# Patient Record
Sex: FEMALE | Race: WHITE
[De-identification: names, ages, dates, MRNs, and addresses within clinical notes are randomized per-mention and may not be internally consistent; named-entity substitution may affect disease eponyms.]

---

## 2017-05-18 ENCOUNTER — HOSPITAL ENCOUNTER (OUTPATIENT)
Dept: HOSPITAL 47 - RADMAMWWP | Age: 67
Discharge: HOME | End: 2017-05-18
Attending: FAMILY MEDICINE
Payer: COMMERCIAL

## 2017-05-18 DIAGNOSIS — Z12.31: Primary | ICD-10-CM

## 2017-05-18 PROCEDURE — 77063 BREAST TOMOSYNTHESIS BI: CPT

## 2018-03-04 ENCOUNTER — HOSPITAL ENCOUNTER (EMERGENCY)
Dept: HOSPITAL 47 - EC | Age: 68
Discharge: HOME | End: 2018-03-04
Payer: COMMERCIAL

## 2018-03-04 VITALS
RESPIRATION RATE: 20 BRPM | TEMPERATURE: 98.3 F | SYSTOLIC BLOOD PRESSURE: 136 MMHG | DIASTOLIC BLOOD PRESSURE: 71 MMHG | HEART RATE: 68 BPM

## 2018-03-04 DIAGNOSIS — R00.1: ICD-10-CM

## 2018-03-04 DIAGNOSIS — F32.9: ICD-10-CM

## 2018-03-04 DIAGNOSIS — Z79.899: ICD-10-CM
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
## 2018-03-04 DIAGNOSIS — Z88.2: ICD-10-CM

## 2018-03-04 DIAGNOSIS — S40.862A: Primary | ICD-10-CM

## 2018-03-04 PROCEDURE — 99284 EMERGENCY DEPT VISIT MOD MDM: CPT

## 2018-03-04 PROCEDURE — 99283 EMERGENCY DEPT VISIT LOW MDM: CPT

## 2018-03-04 PROCEDURE — 93005 ELECTROCARDIOGRAM TRACING: CPT

## 2018-03-04 NOTE — ED
General Adult HPI





- General


Chief complaint: Recheck/Abnormal Lab/Rx


Stated complaint: Abdormal Labs/Cardiac


Time Seen by Provider: 03/04/18 13:19


Source: patient, RN notes reviewed


Mode of arrival: wheelchair


Limitations: no limitations





- History of Present Illness


Initial comments: 





67-year-old female presenting for evaluation of left arm bug bite.  Patient was 

seen at [facility], for some unknown reason patient did have an EKG obtained 

and she was sent for concern of abnormal EKG.  Patient has no history of 

coronary artery disease, no history of hypertension.  She denies any chest pain 

shortness of breath, denies any shoulder pain and jaw pain and neck pain.  

Patient denies nausea vomiting or diarrhea.  Denies fever or chills.  Denies 

any weakness in her left upper extremity.  She was seeking evaluation of itchy 

bug bite to her left upper extremity.  This been present for approximately 3 

weeks, she was recently in [place] and this is where the bug bite occurred.  

She was prescribed Allegra, prednisone, Zantac, and steroid cream prior to 

leaving urgent care.





- Related Data


 Home Medications











 Medication  Instructions  Recorded  Confirmed


 


Citalopram Hydrobromide [CeleXA] 10 mg PO DAILY 08/18/15 08/18/15








 Previous Rx's











 Medication  Instructions  Recorded


 


chlordiazePOXIDE HCl [Librium] 25 mg PO QID #20 capsule 08/18/15











 Allergies











Allergy/AdvReac Type Severity Reaction Status Date / Time


 


Sulfa (Sulfonamide Allergy  Rash/Hives Verified 03/04/18 13:17





Antibiotics)     














Review of Systems


ROS Statement: 


Those systems with pertinent positive or pertinent negative responses have been 

documented in the HPI.





ROS Other: All systems not noted in ROS Statement are negative.





Past Medical History


Additional Past Medical History / Comment(s): alcoholic


History of Any Multi-Drug Resistant Organisms: None Reported


Past Surgical History: Hysterectomy, Orthopedic Surgery


Past Psychological History: Depression


Smoking Status: Never smoker


Past Alcohol Use History: Abuse


Past Drug Use History: None Reported





General Exam


Limitations: no limitations


General appearance: alert, in no apparent distress


Head exam: Present: atraumatic, normocephalic


Eye exam: Present: normal appearance, PERRL, EOMI


ENT exam: Present: normal exam


Neck exam: Present: normal inspection.  Absent: tenderness, meningismus


Respiratory exam: Present: normal lung sounds bilaterally.  Absent: respiratory 

distress


Cardiovascular Exam: Present: regular rate, normal rhythm


GI/Abdominal exam: Present: soft.  Absent: distended, tenderness, guarding


Extremities exam: Present: normal capillary refill, other (Small lesions on the 

anterior surface left upper extremity, no surrounding induration or fluctuance, 

no cellulitis, no signs of deep space infection or ALLERGIC reaction.  Distal 

pulses intact,  strength normal.).  Absent: pedal edema, calf tenderness


Neurological exam: Present: alert, oriented X3, CN II-XII intact.  Absent: 

motor sensory deficit


Psychiatric exam: Present: normal affect, normal mood


Skin exam: Present: warm, dry, intact.  Absent: cyanosis, diaphoretic





Course





 Vital Signs











  03/04/18





  13:15


 


Temperature 98.3 F


 


Pulse Rate 68


 


Respiratory 20





Rate 


 


Blood Pressure 136/71


 


O2 Sat by Pulse 99





Oximetry 














EKG Findings





- EKG Comments:


EKG Findings:: EKG shows sinus bradycardia rate of 59, MA interval 174, QRS 

duration 84, , no ST segment changes, normal T waves, no signs of 

ischemia





Medical Decision Making





- Medical Decision Making





67-year-old female presenting for suspected bug bite.  EKG was obtained and 

medics rest, these EKGs were reviewed, no ST segment changes, no concern for 

ischemia, these were sinus rhythm she is sinus bradycardia with a rate of 59, 

no signs of ischemia.  Patient has no complaints to suggest myocardial 

infarction or ischemia.  Patient will take the medications as prescribed for 

her bug bite, and follow-up with her primary care physician.





Disposition


Clinical Impression: 


 Bug bite





Disposition: HOME SELF-CARE


Condition: Good


Instructions:  Insect Bite or Sting (ED)


Additional Instructions: 


Please take medications prescribed by [facility]


Referrals: 


Ej Lal MD [Primary Care Provider] - 1-2 days


Time of Disposition: 13:48

## 2018-06-04 ENCOUNTER — HOSPITAL ENCOUNTER (OUTPATIENT)
Dept: HOSPITAL 47 - LABWHC1 | Age: 68
Discharge: HOME | End: 2018-06-04
Attending: INTERNAL MEDICINE
Payer: COMMERCIAL

## 2018-06-04 DIAGNOSIS — Z12.31: Primary | ICD-10-CM

## 2018-06-04 PROCEDURE — 77067 SCR MAMMO BI INCL CAD: CPT

## 2018-06-04 PROCEDURE — 77063 BREAST TOMOSYNTHESIS BI: CPT

## 2018-06-05 NOTE — MM
Reason for exam: screening  (asymptomatic).

Last mammogram was performed 1 year and 1 month ago.



History:

Patient is postmenopausal.



Physical Findings:

A clinical breast exam by your physician is recommended on an annual basis and 

results should be correlated with mammographic findings.



MG 3D Screening Mammo W/Cad

Bilateral CC and MLO view(s) were taken.

Prior study comparison: May 18, 2017, bilateral MG 3d screening mammo w/cad.  May 

13, 2016, mammogram, performed at [facility].

There is chronic nodularity in the right breast.  No significant changes when 

compared with prior studies.





ASSESSMENT: Benign, BI-RAD 2



RECOMMENDATION:

Routine screening mammogram of both breasts in 1 year.

## 2020-06-27 ENCOUNTER — HOSPITAL ENCOUNTER (EMERGENCY)
Dept: HOSPITAL 47 - EC | Age: 70
Discharge: HOME | End: 2020-06-27
Payer: COMMERCIAL

## 2020-06-27 VITALS
SYSTOLIC BLOOD PRESSURE: 121 MMHG | TEMPERATURE: 97.7 F | HEART RATE: 68 BPM | RESPIRATION RATE: 18 BRPM | DIASTOLIC BLOOD PRESSURE: 70 MMHG

## 2020-06-27 DIAGNOSIS — Z88.2: ICD-10-CM

## 2020-06-27 DIAGNOSIS — F32.9: ICD-10-CM

## 2020-06-27 DIAGNOSIS — T15.91XA: Primary | ICD-10-CM

## 2020-06-27 DIAGNOSIS — Z79.899: ICD-10-CM

## 2020-06-27 PROCEDURE — 99283 EMERGENCY DEPT VISIT LOW MDM: CPT

## 2020-06-27 NOTE — ED
Eye Problem HPI





- General


Chief complaint: Eye Problems


Stated complaint: eye problem


Time Seen by Provider: 06/27/20 09:30


Source: patient


Mode of arrival: ambulatory


Limitations: no limitations





- History of Present Illness


Initial comments: 





The patient is a 69-year-old female past history of glaucoma and recent cataract

repair who presents to the emergency room with reported foreign body sensation 

in her right eye.  Patient had cataract surgery 5 days ago.  States that her 

vision has been good after the procedure.  Yesterday she was walking outside 

when she felt as that she caught something in her right eye.  She has had 

foreign body sensation without visual disturbance.  No flashes or floaters of 

light.  Denies blurred vision or loss of vision.  She does not wear contacts.  

Patient wears glasses for reading.  Denies ocular pain.  No fevers or chills. 

Increased tearing but denies drainage.  There are no other alleviating, 

precipitating or modifying factors





- Related Data


                                Home Medications











 Medication  Instructions  Recorded  Confirmed


 


Citalopram Hydrobromide [CeleXA] 10 mg PO HS 08/18/15 03/04/18


 


Calcium Carbonate [Calcium] 600 mg PO DAILY 03/04/18 03/04/18


 


Omega-3 Fatty Acids/Fish Oil [Fish 1 cap PO DAILY 03/04/18 03/04/18





Oil 1,000 mg Softgel]   











                                    Allergies











Allergy/AdvReac Type Severity Reaction Status Date / Time


 


Sulfa (Sulfonamide Allergy  Rash/Hives Verified 06/27/20 09:31





Antibiotics)     














Review of Systems


ROS Statement: 


Those systems with pertinent positive or pertinent negative responses have been 

documented in the HPI.





ROS Other: All systems not noted in ROS Statement are negative.





Past Medical History


Past Medical History: No Reported History


Additional Past Medical History / Comment(s): alcoholic


History of Any Multi-Drug Resistant Organisms: None Reported


Past Surgical History: Hysterectomy, Orthopedic Surgery


Additional Past Surgical History / Comment(s): cataract


Past Psychological History: Depression


Smoking Status: Never smoker


Past Alcohol Use History: Abuse


Past Drug Use History: None Reported





General Exam


Limitations: no limitations


General appearance: alert, in no apparent distress


Head exam: Present: atraumatic, normocephalic, normal inspection


Eye exam: Present: normal appearance, PERRL, EOMI, other (nio hyphema. No 

hypopyon. small FB underneath right eyelid).  Absent: scleral icterus, 

conjunctival injection, nystagmus, periorbital swelling, periorbital tenderness


Pupils: Present: normal accommodation





Course


                                   Vital Signs











  06/27/20





  09:24


 


Temperature 97.7 F


 


Pulse Rate 68


 


Respiratory 18





Rate 


 


Blood Pressure 121/70


 


O2 Sat by Pulse 99





Oximetry 














Medical Decision Making





- Medical Decision Making





Upon arrival the patient is placed in room 15.  A thorough history and physical 

exam was performed.  Pupils are equally reactive to light.  Inversion of the 

patient's eyelids demonstrate some makeup underneath the patient's right eye 

which is extracted.  Patient feels as if the foreign body has been removed.  I 

continue to use proparacaine and fluorescein to numb and dye the patients eye.  

No abrasions identified.  Recheck she is performed.  Ocular pressures are 

obtained and are 21.  Patient be discharged home at this time.  Follow-up with 

Dr. Mendoza on Monday.  Return to the emergency room for any new or worsening 

symptoms. The patient was discharged in stable condition





Disposition


Clinical Impression: 


 Foreign body of eye, external, right





Disposition: HOME SELF-CARE


Condition: Stable


Instructions (If sedation given, give patient instructions):  Eye Foreign Body 

(ED)


Additional Instructions: 


Please call Dr. Mendoza on Monday to notify him of your presence in the emergency 

department.  Return to the emergency room for any new or worsening symptoms


Is patient prescribed a controlled substance at d/c from ED?: No


Referrals: 


Grzegorz Eugene MD [Primary Care Provider] - 1-2 days


Evens Mendoza MD [STAFF PHYSICIAN] - 1-2 days


Time of Disposition: 10:09

## 2020-09-14 ENCOUNTER — HOSPITAL ENCOUNTER (OUTPATIENT)
Dept: HOSPITAL 47 - RADMAMWWP | Age: 70
Discharge: HOME | End: 2020-09-14
Attending: INTERNAL MEDICINE
Payer: COMMERCIAL

## 2020-09-14 DIAGNOSIS — Z12.31: Primary | ICD-10-CM

## 2020-09-14 PROCEDURE — 77063 BREAST TOMOSYNTHESIS BI: CPT

## 2020-09-14 PROCEDURE — 77067 SCR MAMMO BI INCL CAD: CPT

## 2020-09-16 NOTE — MM
Reason for exam: screening  (asymptomatic).

Last mammogram was performed 1 year and 3 months ago.



History:

Patient is postmenopausal.



Physical Findings:

A clinical breast exam by your physician is recommended on an annual basis and 

results should be correlated with mammographic findings.



MG 3D Screening Mammo W/Cad

Bilateral CC and MLO view(s) were taken.

Prior study comparison: June 20, 2019, bilateral MG 3d screening mammo w/cad.  

June 4, 2018, bilateral MG 3d screening mammo w/cad.

The breast tissue is heterogeneously dense. This may lower the sensitivity of 

mammography.  Benign appearing bilateral calcifications.  No significant changes 

when compared with prior studies.





ASSESSMENT: Benign, BI-RAD 2



RECOMMENDATION:

Routine screening mammogram of both breasts in 1 year.

## 2021-10-15 ENCOUNTER — HOSPITAL ENCOUNTER (OUTPATIENT)
Dept: HOSPITAL 47 - RADMAMWWP | Age: 71
Discharge: HOME | End: 2021-10-15
Payer: COMMERCIAL

## 2021-10-15 DIAGNOSIS — Z12.31: Primary | ICD-10-CM

## 2021-10-15 DIAGNOSIS — Z78.0: ICD-10-CM

## 2021-10-15 PROCEDURE — 77067 SCR MAMMO BI INCL CAD: CPT

## 2021-10-15 PROCEDURE — 77063 BREAST TOMOSYNTHESIS BI: CPT

## 2021-10-18 NOTE — MM
Reason for exam: screening  (asymptomatic).

Last mammogram was performed 1 year and 1 month ago.



History:

Patient is postmenopausal.



Physical Findings:

A clinical breast exam by your physician is recommended on an annual basis and 

results should be correlated with mammographic findings.



MG 3D Screening Mammo W/Cad

Bilateral CC and MLO view(s) were taken.

Prior study comparison: September 14, 2020, bilateral MG 3d screening mammo w/cad.

June 20, 2019, bilateral MG 3d screening mammo w/cad.

The breast tissue is heterogeneously dense. This may lower the sensitivity of 

mammography.  No significant changes when compared with prior studies.





ASSESSMENT: Negative, BI-RAD 1



RECOMMENDATION:

Routine screening mammogram of both breasts in 1 year.

## 2022-10-18 ENCOUNTER — HOSPITAL ENCOUNTER (OUTPATIENT)
Dept: HOSPITAL 47 - RADMAMWWP | Age: 72
Discharge: HOME | End: 2022-10-18
Attending: FAMILY MEDICINE
Payer: COMMERCIAL

## 2022-10-18 DIAGNOSIS — Z12.31: Primary | ICD-10-CM

## 2022-10-18 DIAGNOSIS — Z78.0: ICD-10-CM

## 2022-10-18 PROCEDURE — 77067 SCR MAMMO BI INCL CAD: CPT

## 2022-10-18 PROCEDURE — 77063 BREAST TOMOSYNTHESIS BI: CPT

## 2022-10-19 NOTE — MM
Reason for Exam: Screening  (asymptomatic). 

Last screening mammogram was performed 12 month(s) ago.





Patient History: 

Menarche at age 11. Left ovary removed at age 33. Right ovary removed at age 33. Hysterectomy at age

33. Postmenopausal. 





Risk Values: 

Kasia 5 year model risk: 1.3%.

NCI Lifetime model risk: 3.0%.





Prior Study Comparison: 

6/20/2019 Bilateral Screening Mammogram, [facility]. 9/14/2020 Bilateral Screening Mammogram, [facility].

10/15/2021 Bilateral Screening Mammogram, [facility]. 





Tissue Density: 

The breast tissue is heterogeneously dense. This may lower the sensitivity of mammography.





Findings: 

Analyzed By CAD. 

Asymmetric density subareolar right cc view remains unchanged. There are areas of asymmetric density

including nodular asymmetric density posterior medial left CC view also unchanged. No significant

change from prior exams. 





Overall Assessment: Benign, BI-RAD 2





Management: 

Screening Mammogram of both breasts in 1 year.

1. Patient should continue monthly self breast exams.

2. A clinical breast exam by your physician is recommended on an annual basis.

3. This exam should not preclude additional follow-up of suspicious palpable abnormalities.



Electronically signed and approved by: Romulo Quintero M.D. Radiologist

## 2022-10-19 NOTE — MM
Reason for Exam: Screening  (asymptomatic). 

Last screening mammogram was performed 12 month(s) ago.





Patient History: 

Menarche at age 11. Left ovary removed at age 33. Right ovary removed at age 33. Hysterectomy at age

33. Postmenopausal. 





Risk Values: 

Kasia 5 year model risk: 1.3%.

NCI Lifetime model risk: 3.0%.





Prior Study Comparison: 

6/20/2019 Bilateral Screening Mammogram, [facility]. 9/14/2020 Bilateral Screening Mammogram, [facility].

10/15/2021 Bilateral Screening Mammogram, [facility]. 





Tissue Density: 

The breast tissue is heterogeneously dense. This may lower the sensitivity of mammography.





Findings: 

Analyzed By CAD. 

Asymmetric density subareolar right cc view remains unchanged. There are areas of asymmetric density

including nodular asymmetric density posterior medial left CC view also unchanged. No significant

change from prior exams. 





Overall Assessment: Benign, BI-RAD 2





Management: 

Screening Mammogram of both breasts in 1 year.

1. Patient should continue monthly self breast exams.

2. A clinical breast exam by your physician is recommended on an annual basis.

3. This exam should not preclude additional follow-up of suspicious palpable abnormalities.



Electronically signed and approved by: Romulo Quintero M.D. Radiologist

## 2023-09-05 ENCOUNTER — HOSPITAL ENCOUNTER (EMERGENCY)
Dept: HOSPITAL 47 - EC | Age: 73
Discharge: HOME | End: 2023-09-05
Payer: COMMERCIAL

## 2023-09-05 VITALS
HEART RATE: 76 BPM | SYSTOLIC BLOOD PRESSURE: 140 MMHG | DIASTOLIC BLOOD PRESSURE: 68 MMHG | TEMPERATURE: 98 F | RESPIRATION RATE: 17 BRPM

## 2023-09-05 DIAGNOSIS — Z79.899: ICD-10-CM

## 2023-09-05 DIAGNOSIS — F32.A: ICD-10-CM

## 2023-09-05 DIAGNOSIS — K13.0: Primary | ICD-10-CM

## 2023-09-05 DIAGNOSIS — Z88.2: ICD-10-CM

## 2023-09-05 PROCEDURE — 99283 EMERGENCY DEPT VISIT LOW MDM: CPT

## 2023-09-05 NOTE — ED
General Adult HPI





- General


Source: patient, RN notes reviewed


Mode of arrival: ambulatory


Limitations: no limitations





<Марина Avalos - Last Filed: 09/05/23 21:27>





<Yoel Jenkins - Last Filed: 09/05/23 22:45>





- General


Chief complaint: Recheck/Abnormal Lab/Rx


Stated complaint: Swelling in bottom lip, Pain


Time Seen by Provider: 09/05/23 21:27





- History of Present Illness


Initial comments: 





72-year-old  female presents to the emergency department the chief 

complaint of lip pain.  Patient reports rash, tight feeling to her lower lip for

8 weeks.  Patient recently placed on acyclovir.  Denies difficulty in breathing 

(Марина Avalos)


72-year-old female presenting with chief complaint of pain and swelling to the 

lower lip.  This has been ongoing for 8 weeks.  She states that she has on her 

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  She was recently

started on acyclovir which did not seem to help.  She states that it is a 

burning pain.  No difficulty breathing or swallowing.  No new foods, 

medications, topical products.  No swelling of the tongue. (Yoel Jenkins)





- Related Data


                                Home Medications











 Medication  Instructions  Recorded  Confirmed


 


Citalopram Hydrobromide [CeleXA] 10 mg PO HS 08/18/15 03/04/18


 


Calcium Carbonate [Calcium] 600 mg PO DAILY 03/04/18 03/04/18


 


Omega-3 Fatty Acids/Fish Oil [Fish 1 cap PO DAILY 03/04/18 03/04/18





Oil 1,000 mg Softgel]   








                                  Previous Rx's











 Medication  Instructions  Recorded


 


Amoxic-Pot Clav 875-125Mg 1 tab PO Q12HR 7 Days #14 tab 09/05/23





[Augmentin 875-125]  


 


methylPREDNISolone Dose Pack 4 mg PO AS DIRECTED #1 packet 09/05/23





[Medrol Dose Pack]  











                                    Allergies











Allergy/AdvReac Type Severity Reaction Status Date / Time


 


Sulfa (Sulfonamide Allergy  Rash/Hives Verified 09/05/23 20:19





Antibiotics)     














Review of Systems


ROS Other: All systems not noted in ROS Statement are negative.





<Марина Avalos - Last Filed: 09/05/23 21:27>


ROS Other: All systems not noted in ROS Statement are negative.





<Yoel Jenkins - Last Filed: 09/05/23 22:45>


ROS Statement: 


Those systems with pertinent positive or pertinent negative responses have been 

documented in the HPI.








Past Medical History


Past Medical History: No Reported History


Additional Past Medical History / Comment(s): alcoholic


History of Any Multi-Drug Resistant Organisms: None Reported


Past Surgical History: Hysterectomy, Orthopedic Surgery


Additional Past Surgical History / Comment(s): cataract


Past Psychological History: Depression


Smoking Status: Never smoker


Past Alcohol Use History: Abuse


Past Drug Use History: None Reported





<Марина Avalos - Last Filed: 09/05/23 21:27>





General Exam


Limitations: no limitations





<Марина Avalos - Last Filed: 09/05/23 21:27>


Limitations: no limitations


General appearance: alert, in no apparent distress


Head exam: Present: atraumatic, normocephalic, normal inspection


Eye exam: Present: normal appearance, EOMI.  Absent: periorbital swelling


  ** Expanded


Mouth exam: Present: tongue normal, other (Swelling to the lower lip, scabs seen

on the lip).  Absent: drooling, trismus, muffled voice


Throat exam: normal inspection


Neck exam: Present: normal inspection, full ROM


Respiratory exam: Present: normal lung sounds bilaterally.  Absent: respiratory 

distress, wheezes, rales, rhonchi, stridor


Cardiovascular Exam: Present: regular rate, normal rhythm, normal heart sounds. 

Absent: systolic murmur, diastolic murmur, rubs, gallop, clicks


Neurological exam: Present: alert, oriented X3, CN II-XII intact


Psychiatric exam: Present: normal affect, normal mood


Skin exam: Present: warm, dry, intact, normal color.  Absent: rash





<Yoel Jenkins - Last Filed: 09/05/23 22:45>





- General Exam Comments


Initial Comments: 





Visual Physical Exam





Vital signs reviewed





General: Well-appearing, nontoxic, no acute distress.


Head: Normocephalic, atraumatic


Eyes: PERRLA, EOMI


ENT: Airway patent


Chest: Nonlabored breathing


Skin: No visual rash, normal skin tone


Neuro: Alert and oriented 3


Musculoskeletal: No gross abnormalities





I performed the quick note portion of this exam, verbal signature Марина das PA-C (Марина Avalos)





Course


                                   Vital Signs











  09/05/23 09/05/23





  20:17 22:26


 


Temperature 98.3 F 98.0 F


 


Pulse Rate 83 76


 


Respiratory 18 17





Rate  


 


Blood Pressure 155/74 140/68


 


O2 Sat by Pulse 97 97





Oximetry  














Medical Decision Making





<Yoel Jenkins - Last Filed: 09/05/23 22:45>





- Medical Decision Making


Was pt. sent in by a medical professional or institution ([facility] M[facility], NP, urgent 

care, hospital, or nursing home...) When possible be specific


@  -No


Did you speak to anyone other than the patient for history (EMS, parent, family,

police, friend...)? What history was obtained from this source 


@  -No


Did you review nursing and triage notes (agree or disagree)?  Why? 


@  -I reviewed and agree with nursing and triage notes


Were old charts reviewed (outside hosp., previous admission, EMS record, old 

EKG, old radiological studies, urgent care reports/EKG's, nursing home records)?

Report findings 


@  -No old charts were reviewed


Differential Diagnosis (chest pain, altered mental status, abdominal pain women,

abdominal pain men, vaginal bleeding, weakness, fever, dyspnea, syncope, 

headache, dizziness, GI bleed, back pain, seizure, CVA, palpatations, mental 

health, musculoskeletal)? 


@  -Differential includes ALLERGIC reaction, cellulitis, cold sore, this is not 

an all inclusive list


EKG interpreted by me (3pts min.).


@  -As above


X-rays interpreted by me (1pt min.).


@  -None done


CT interpreted by me (1pt min.).


@  -None done


U/S interpreted by me (1pt. min.).


@  -None done


What testing was considered but not performed or refused? (CT, X-rays, U/S, 

labs)? Why?


@  -None


What meds were considered but not given or refused? Why?


@  -None


Did you discuss the management of the patient with other professionals 

(professionals i.e. [facility] Moy, NP, lab, RT, psych nurse, , , 

teacher, , )? Give summary


@  -No


Was smoking cessation discussed for >3mins.?


@  -No


Was critical care preformed (if so, how long)?


@  -No


Were there social determinants of health that impacted care today? How? 

(Homelessness, low income, unemployed, alcoholism, drug addiction, transportat

ion, low edu. Level, literacy, decrease access to med. care, long-term, rehab)?


@  -No


Was there de-escalation of care discussed even if they declined (Discuss DNR or 

withdrawal of care, Hospice)? DNR status


@  -No


What co-morbidities impacted this encounter? (DM, HTN, Smoking, COPD, CAD, 

Cancer, CVA, ARF, Chemo, Hep., AIDS, mental health diagnosis, sleep apnea, 

morbid obesity)?


@  -None


Was patient admitted / discharged? Hospital course, mention meds given and 

route, prescriptions, significant lab abnormalities, going to OR and other 

pertinent info.


@  -72-year-old female presenting with chief complaint of pain and swelling to 

the lower lip ongoing for several weeks.  She was treated with acyclovir which 

did not relieve her symptoms.  On physical examination there is some swelling to

the lower lip with some scabs noted.  No swelling of the tongue and normal post

erior pharynx is noted.  No difficulty breathing.  Heart and lungs are clear to 

auscultation.  Patient is instructed take Benadryl at home as needed, she did 

provide a Medrol Dosepak.  She will also be treated  with Augmentin for possible

bacterial infection. Follow-up with PCP.  Report back to ER with any new or 

worsening symptoms.  Discussed return parameters and answered all questions.  

Patient conveyed verbal understanding and agreed to the plan.  I discussed this 

case in detail with my attending Dr. 


Undiagnosed new problem with uncertain prognosis?


@  -No


Drug Therapy requiring intensive monitoring for toxicity (Heparin, Nitro, 

Insulin, Cardizem)?


@  -No


Were any procedures done?


@  -No


Diagnosis/symptom?


@  -Cellulitis


Acute, or Chronic, or Acute on Chronic?


@  -[Acute


Uncomplicated (without systemic symptoms) or Complicated (systemic symptoms)?


@  -[Uncomplicated


Side effects of treatment?


@  -[o]Exacerbation, Progression, or Severe Exacerbation?


@  -[o]Poses a threat to life or bodily function? How? (Chest pain, USA, MI, 

pneumonia, PE, COPD, DKA, ARF, appy, cholecystitis, CVA, Diverticulitis, 

Homicidal, Suicidal, threat to staff... and all critical care pts)


@  -[o] (Yoel Jenkins)





Disposition





<Марина Avalos - Last Filed: 09/05/23 21:27>


Is patient prescribed a controlled substance at d/c from ED?: No


Time of Disposition: 21:54





<Yoel Jenkins - Last Filed: 09/05/23 22:45>


Clinical Impression: 


 Cellulitis





Disposition: HOME SELF-CARE


Condition: Good


Instructions (If sedation given, give patient instructions):  Cellulitis (ED)


Additional Instructions: 


Follow-up with PCP.  Report back to ER with any new or worsening symptoms.  Take

 Benadryl as needed.


Prescriptions: 


Amoxic-Pot Clav 875-125Mg [Augmentin 875-125] 1 tab PO Q12HR 7 Days #14 tab


methylPREDNISolone Dose Pack [Medrol Dose Pack] 4 mg PO AS DIRECTED #1 packet


Referrals: 


Ana Maria Pino MD [Primary Care Provider] - 1-2 days

## 2023-10-19 ENCOUNTER — HOSPITAL ENCOUNTER (OUTPATIENT)
Dept: HOSPITAL 47 - RADMAMWWP | Age: 73
Discharge: HOME | End: 2023-10-19
Attending: FAMILY MEDICINE
Payer: COMMERCIAL

## 2023-10-19 DIAGNOSIS — M85.89: ICD-10-CM

## 2023-10-19 DIAGNOSIS — Z78.0: ICD-10-CM

## 2023-10-19 DIAGNOSIS — Z12.31: Primary | ICD-10-CM

## 2023-10-19 PROCEDURE — 77080 DXA BONE DENSITY AXIAL: CPT

## 2023-10-19 PROCEDURE — 77067 SCR MAMMO BI INCL CAD: CPT

## 2023-10-19 PROCEDURE — 77063 BREAST TOMOSYNTHESIS BI: CPT

## 2023-10-20 NOTE — BD
EXAMINATION TYPE: Axial Bone Density

 

DATE OF EXAM: 10/19/2023

 

CLINICAL HISTORY: 73 years old Female.  ICD-10 CODE: Z78.0 POST MENOPAUSAL WITHOUT HRT

 

Height:

Weight:

 

FRAX RISK QUESTIONS:

Family History (Parent hip fracture):  no

History of Fracture in Adulthood: no

Secondary Osteoporosis: no

 

 

RISK FACTORS 

HISTORY OF: 

Family History of Osteoporosis: no

Active: yes

Diet low in dairy products/other sources of calcium:  no

Postmenopausal woman: yes

Lost more than 2 inches in height since high school: yes was 64

Frequent falls: no

Poor Health: no

 

MEDICATIONS: 

Additional Medications: yes 

anxiety 

 

 

 

EXAM MEASUREMENTS: 

Bone mineral densitometry was performed using the [employer] [facility].

Bone mineral density as measured about the Lumbar spine is:  

----- L1-L4(G/cm2): 1.278

T Score Values are as follows:

----- L1: -30

----- L2: 0.5

----- L3: 1.5

----- L4: 1.5

----- L1-L4: 0.8

 

Z Score Values are as follows:

----- L1: 1.3

----- L2: 2.1

----- L3: 3.1

----- L4: 3.1

----- L1-L4: 2.4

 

Bone mineral density baseline

 

Bone mineral density about the R hip (g/cm2): 0.936

Bone mineral density about the L hip (g/cm2): 0.947

T Score values are as follows:

-----R Neck: -1.6

-----L Neck: -1.2

-----R Total: -0.6

-----L Total: -0.5

 

Z Score values are as follows:

-----R Neck: 0.2

-----L Neck: 0.6

-----R Total: 1.0

-----L Total: 1.1

 

Bone mineral density baseline

 

 

FRAX%s: The graph provided illustrates a 11.6% chance for a major osteoporotic fx and a 2.5% chance f
or the hips probability for fx in 10 years time.

 

 

 

 

IMPRESSION:

Normal (Values between +1 and -1 indicate normal bone mass).  Consider repeating this study in 5 year
s or sooner if there is some new clinical indication.

 

NOTE:  T-SCORE=SD OF THE YOUNG ADULT MEAN.

## 2024-09-04 ENCOUNTER — HOSPITAL ENCOUNTER (OUTPATIENT)
Dept: HOSPITAL 47 - RADXRMAIN | Age: 74
Discharge: HOME | End: 2024-09-04
Attending: INTERNAL MEDICINE
Payer: COMMERCIAL

## 2024-09-04 DIAGNOSIS — R31.29: Primary | ICD-10-CM

## 2024-09-04 PROCEDURE — 74018 RADEX ABDOMEN 1 VIEW: CPT

## 2024-09-04 NOTE — XR
EXAMINATION TYPE: XR KUB

 

DATE OF EXAM: 9/4/2024 8:07 AM

 

CLINICAL INDICATION: Female, 73 years old with history of R31.29 Microscopic hematuria; PHH

 

COMPARISON: None.

 

TECHNIQUE: One radiographic view of the abdomen was obtained.

 

FINDINGS: The bowel gas pattern is nonspecific without dilated loops of small or large bowel. . Fecal
 material and gas are demonstrated throughout the colon and rectum. 

There is no evidence for organomegaly or pneumoperitoneum.  The osseous structures are intact.  No ab
normal calcifications are present. 

 

IMPRESSION:

Nonspecific bowel gas pattern without radiographic evidence for acute process.

## 2024-10-21 ENCOUNTER — HOSPITAL ENCOUNTER (OUTPATIENT)
Dept: HOSPITAL 47 - RADMAMWWP | Age: 74
Discharge: HOME | End: 2024-10-21
Attending: INTERNAL MEDICINE
Payer: COMMERCIAL

## 2024-10-21 PROCEDURE — 77063 BREAST TOMOSYNTHESIS BI: CPT

## 2024-10-21 PROCEDURE — 77067 SCR MAMMO BI INCL CAD: CPT

## 2024-10-22 NOTE — MM
Reason for Exam: Screening  (asymptomatic). 

Last screening mammogram was performed 12 month(s) ago.





Patient History: 

Menarche at age 11. Patient has no children. Left ovary removed at age 33. Right ovary removed at

age 33. Hysterectomy at age 33. Postmenopausal. 





Risk Values: 

Kasia 5 year model risk: 2.0%.

NCI Lifetime model risk: 4.0%.





Prior Study Comparison: 

5/18/2017 Bilateral Screening Mammogram, [facility]. 6/4/2018 Bilateral Screening Mammogram, [facility]. 6/20/2019

Bilateral Screening Mammogram, [facility]. 9/14/2020 Bilateral Screening Mammogram, [facility]. 10/15/2021

Bilateral Screening Mammogram, [facility]. 10/18/2022 Bilateral MG 3D screening mammo w/cad, [facility].

10/19/2023 Bilateral MG 3D screening mammo w/cad, [facility]. 





Tissue Density: 

The breasts are heterogeneously dense, which may obscure small masses.





Findings: 

Analyzed By CAD. 

Unchanged bilateral areas of asymmetric density. Benign round calcifications posterior right upper

outer quadrant.

There is no suspicious group of microcalcifications or new suspicious mass in either breast. 





Overall Assessment: Benign, BI-RAD 2





Management: 

Screening Mammogram of both breasts in 1 year.

.



Patient should continue monthly self-breast exams.  A clinical breast exam by your physician is

recommended on an annual basis.

This exam should not preclude additional follow-up of suspicious palpable abnormalities.



Note on Kasia scores and lifetime risk:

1. A Kasia score greater than 3% is considered moderate risk. If this is the case, consider

specialist referral to assess eligibility for a risk reducing agent.

2. If overall lifetime risk for the development of breast cancer is 20% or higher, the patient may

qualify for future screening with alternating mammogram and breast MRI.









[facility], Workstation: [id number], 10/22/2024 6:59 PM.



Electronically signed and approved by: Romulo Quintero M.D. Radiologist

## 2024-10-28 ENCOUNTER — HOSPITAL ENCOUNTER (OUTPATIENT)
Dept: HOSPITAL 47 - RADXRMAIN | Age: 74
Discharge: HOME | End: 2024-10-28
Attending: INTERNAL MEDICINE
Payer: COMMERCIAL

## 2024-10-28 DIAGNOSIS — M19.011: Primary | ICD-10-CM

## 2024-10-28 NOTE — XR
EXAMINATION TYPE: XR shoulder complete 3 views RT

 

DATE OF EXAM: 10/28/2024

 

Comparison: None

 

Clinical History: 74-year-old female M25.511 acute R shoulder pain

 

Findings:

Severe degenerative joint space narrowing with subchondral sclerosis and marginal spurring at the AC 
joint. Subacromial space is preserved. No acute fracture, subluxation, dislocation.

 

 

Impression:

Severe AC joint OA. The inferior spurring may also contribute to subacromial impingement. No acute os
seous abnormality seen.

 

[facility], Workstation: [id number], 10/28/2024 2:00 PM

## 2025-05-02 ENCOUNTER — HOSPITAL ENCOUNTER (OUTPATIENT)
Dept: HOSPITAL 47 - RADXRMAIN | Age: 75
Discharge: HOME | End: 2025-05-02
Attending: INTERNAL MEDICINE
Payer: COMMERCIAL

## 2025-05-02 DIAGNOSIS — J43.9: Primary | ICD-10-CM

## 2025-05-02 DIAGNOSIS — R09.89: ICD-10-CM

## 2025-05-02 PROCEDURE — 71046 X-RAY EXAM CHEST 2 VIEWS: CPT

## 2025-05-08 VITALS — BODY MASS INDEX: 24.5 KG/M2

## 2025-05-09 ENCOUNTER — HOSPITAL ENCOUNTER (OUTPATIENT)
Dept: HOSPITAL 47 - ORWHC2ENDO | Age: 75
Discharge: HOME | End: 2025-05-09
Attending: INTERNAL MEDICINE
Payer: COMMERCIAL

## 2025-05-09 VITALS — SYSTOLIC BLOOD PRESSURE: 118 MMHG | HEART RATE: 78 BPM | DIASTOLIC BLOOD PRESSURE: 76 MMHG | RESPIRATION RATE: 18 BRPM

## 2025-05-09 VITALS — TEMPERATURE: 96.4 F

## 2025-05-09 DIAGNOSIS — E78.5: ICD-10-CM

## 2025-05-09 DIAGNOSIS — F32.A: ICD-10-CM

## 2025-05-09 DIAGNOSIS — K57.30: ICD-10-CM

## 2025-05-09 DIAGNOSIS — Z98.49: ICD-10-CM

## 2025-05-09 DIAGNOSIS — Z12.11: Primary | ICD-10-CM

## 2025-05-09 DIAGNOSIS — Z90.710: ICD-10-CM

## 2025-05-09 DIAGNOSIS — Z88.2: ICD-10-CM

## 2025-05-09 DIAGNOSIS — Z79.899: ICD-10-CM

## 2025-05-09 PROCEDURE — 45378 DIAGNOSTIC COLONOSCOPY: CPT

## 2025-05-09 RX ADMIN — POTASSIUM CHLORIDE ONE MLS: 14.9 INJECTION, SOLUTION INTRAVENOUS at 10:28

## 2025-05-09 RX ADMIN — POTASSIUM CHLORIDE SCH MLS/HR: 14.9 INJECTION, SOLUTION INTRAVENOUS at 10:21

## 2025-06-30 ENCOUNTER — HOSPITAL ENCOUNTER (OUTPATIENT)
Dept: HOSPITAL 47 - CATHCVL | Age: 75
Discharge: HOME | End: 2025-06-30
Attending: INTERNAL MEDICINE
Payer: COMMERCIAL

## 2025-06-30 VITALS — BODY MASS INDEX: 25 KG/M2

## 2025-06-30 VITALS — RESPIRATION RATE: 16 BRPM | TEMPERATURE: 97.9 F

## 2025-06-30 VITALS — SYSTOLIC BLOOD PRESSURE: 158 MMHG | HEART RATE: 72 BPM | DIASTOLIC BLOOD PRESSURE: 72 MMHG

## 2025-06-30 DIAGNOSIS — Z79.84: ICD-10-CM

## 2025-06-30 DIAGNOSIS — I25.10: Primary | ICD-10-CM

## 2025-06-30 DIAGNOSIS — Z88.1: ICD-10-CM

## 2025-06-30 DIAGNOSIS — Z88.2: ICD-10-CM

## 2025-06-30 DIAGNOSIS — E78.2: ICD-10-CM

## 2025-06-30 DIAGNOSIS — Z87.891: ICD-10-CM

## 2025-06-30 DIAGNOSIS — E11.9: ICD-10-CM

## 2025-06-30 DIAGNOSIS — I25.84: ICD-10-CM

## 2025-06-30 DIAGNOSIS — I25.83: ICD-10-CM

## 2025-06-30 DIAGNOSIS — Z79.82: ICD-10-CM

## 2025-06-30 DIAGNOSIS — Z79.899: ICD-10-CM

## 2025-06-30 LAB — GLUCOSE BLD-MCNC: 111 MG/DL (ref 70–110)

## 2025-06-30 PROCEDURE — 92978 ENDOLUMINL IVUS OCT C 1ST: CPT

## 2025-06-30 PROCEDURE — 93458 L HRT ARTERY/VENTRICLE ANGIO: CPT

## 2025-06-30 PROCEDURE — 93799 UNLISTED CV SVC/PROCEDURE: CPT

## 2025-06-30 RX ADMIN — FENTANYL CITRATE ONE MCG: 50 INJECTION, SOLUTION INTRAMUSCULAR; INTRAVENOUS at 07:33

## 2025-06-30 RX ADMIN — PROPOFOL ONE MLS/HR: 10 INJECTION, EMULSION INTRAVENOUS at 06:49

## 2025-06-30 RX ADMIN — CEFAZOLIN PRN MLS: 330 INJECTION, POWDER, FOR SOLUTION INTRAMUSCULAR; INTRAVENOUS at 07:32

## 2025-06-30 RX ADMIN — ASPIRIN 325 MG ORAL TABLET STA: 325 PILL ORAL at 06:48

## 2025-06-30 RX ADMIN — IOPAMIDOL ONE ML: 755 INJECTION, SOLUTION INTRAVENOUS at 08:41

## 2025-06-30 RX ADMIN — CLOPIDOGREL BISULFATE ONE MG: 75 TABLET ORAL at 08:00

## 2025-06-30 RX ADMIN — NITROGLYCERIN ONE MCG: 10 INJECTION INTRAVENOUS at 07:54

## 2025-06-30 RX ADMIN — HEPARIN SODIUM ONE UNIT: 1000 INJECTION, SOLUTION INTRAVENOUS; SUBCUTANEOUS at 07:40

## 2025-06-30 RX ADMIN — LIDOCAINE HYDROCHLORIDE ONE ML: 10 INJECTION, SOLUTION INFILTRATION; PERINEURAL at 07:33

## 2025-06-30 RX ADMIN — VERAPAMIL HYDROCHLORIDE ONE ML: 2.5 INJECTION, SOLUTION INTRAVENOUS at 07:38

## 2025-06-30 RX ADMIN — POTASSIUM CHLORIDE ONE MLS: 14.9 INJECTION, SOLUTION INTRAVENOUS at 06:49

## 2025-06-30 RX ADMIN — POTASSIUM CHLORIDE ONE MLS: 14.9 INJECTION, SOLUTION INTRAVENOUS at 11:30

## 2025-06-30 RX ADMIN — IOPAMIDOL ONE ML: 755 INJECTION, SOLUTION INTRAVENOUS at 08:08

## 2025-06-30 RX ADMIN — MIDAZOLAM ONE MG: 1 INJECTION INTRAMUSCULAR; INTRAVENOUS at 07:43

## 2025-06-30 RX ADMIN — ASPIRIN 81 MG CHEWABLE TABLET SCH: 81 TABLET CHEWABLE at 14:09

## 2025-06-30 RX ADMIN — METHYLENE BLUE PRN MLS: 10 INJECTION INTRAVENOUS at 07:32
